# Patient Record
(demographics unavailable — no encounter records)

---

## 2025-05-16 NOTE — HISTORY OF PRESENT ILLNESS
[FreeTextEntry1] : 68 y.o. Female with Hx of Pre-diabetes and Multiple thyroid nodules presents to establish care with new endocrinologist for close proximity. Previously seen at Dr. Vargas's offices. Denies Hx of neck cancer or radiation. Pre-diabetes was previously Tx with Metformin but due to controlled BG, her PCP recommended diet control. Currently not taking thyroid or diabetes medications

## 2025-05-16 NOTE — PHYSICAL EXAM
[Alert] : alert [Well Nourished] : well nourished [Healthy Appearance] : healthy appearance [No Acute Distress] : no acute distress [Well Developed] : well developed [Normal Voice/Communication] : normal voice communication [PERRL] : pupils equal, round and reactive to light [Normal Hearing] : hearing was normal [No Neck Mass] : no neck mass was observed [Thyroid Not Enlarged] : the thyroid was not enlarged [No Thyroid Nodules] : no palpable thyroid nodules [No Respiratory Distress] : no respiratory distress [Clear to Auscultation] : lungs were clear to auscultation bilaterally [Normal Rate] : heart rate was normal [Regular Rhythm] : with a regular rhythm [No Edema] : no peripheral edema [Normal Bowel Sounds] : normal bowel sounds [Soft] : abdomen soft [Normal Supraclavicular Nodes] : no supraclavicular lymphadenopathy [Normal Anterior Cervical Nodes] : no anterior cervical lymphadenopathy [No Clubbing, Cyanosis] : no clubbing  or cyanosis of the fingernails [Normal Reflexes] : deep tendon reflexes were 2+ and symmetric [No Tremors] : no tremors [Oriented x3] : oriented to person, place, and time [Normal Affect] : the affect was normal [Normal Insight/Judgement] : insight and judgment were intact [Normal Mood] : the mood was normal

## 2025-05-16 NOTE — ASSESSMENT
[FreeTextEntry1] : 68 y.o. Female with Hx of Pre-diabetes and Multiple thyroid nodules presened initially in 2024 to establish care with new endocrinologist for close proximity. Previously seen at Dr. Vargas's offices. Denies Hx of neck cancer or radiation. Pre-diabetes was previously Tx with Metformin but due to controlled BG, her PCP recommended diet control. Currently not taking thyroid or diabetes medications  Patient presents accompanied by her . Denies compressive symptoms, neck pain, sore throat or dysphagia  # Hx of Multiple thyroid nodules Underwent FNA of the following nodules on 6/19/24 1. RUP 1.1 x 0.9 x 0.6 cm TR5 - benign 2. RMP 1.0 x 0.9 x 0.7 cm TR4 - benign 3. RLP 0.8 x 0.8 x 0.6 cm TR4 (insuficient sample, cystic nodule low risk) 4. LUP 1.4 x 1.1 x 0.9 cm TR3 - benign 5. LMP 1.0 x 1.0 x 0.8 cm TR4 - benign  Patient repeated US this time at  on 4/28/25. Although with different appearance, the above nodules look stable Will repeat US in 1 year at   # Hx of Prediabetes Previously on MF. Now off medications Discussed dietary and lifestyle modifications Will obtain BW from PCP - recently done If normal can be followed by PCP  F/u in 1 year.

## 2025-06-03 NOTE — HISTORY OF PRESENT ILLNESS
[FreeTextEntry1] : NPV- itchy rash [de-identified] : Caden  3 2025 11:00AM   68 year F new patient here for evaluation of rash on arms and abdomen x few days. Very itchy, not sure if posion ivy but no known exposure.   Also dark spot on cheek  All: NKDA No personal or family hx of skin cancer

## 2025-06-03 NOTE — ASSESSMENT
[FreeTextEntry1] : # Dermatitis, favor ACD, acute, flare - education, counseling - unclear trigger, possibly poison ivy - start clobetasol ointment BID x 2-3 weeks, SED, not for face,groin, armpits, take breaks - if posion ivy, ensure wash off resin on bedsheets, shoes, etc - counseled to call us if worsening and will send pred  #Lentigines, cheek - education, counseling, reassurance - sunprotection - could consider IPL, oop cost

## 2025-06-03 NOTE — PHYSICAL EXAM
[FreeTextEntry3] : General: well appearing person in nad, alert, pleasant Focused Skin Exam per patient preference: L extensor arm with linear erythematous plaque R abdomen and R arm with erythematous papules; no vesicles B/l cheeks with hyperpigmented macules. dermoscopy with benign features